# Patient Record
Sex: FEMALE | Race: WHITE
[De-identification: names, ages, dates, MRNs, and addresses within clinical notes are randomized per-mention and may not be internally consistent; named-entity substitution may affect disease eponyms.]

---

## 2017-05-23 ENCOUNTER — HOSPITAL ENCOUNTER (OUTPATIENT)
Dept: HOSPITAL 80 - CED | Age: 72
Discharge: HOME | End: 2017-05-23
Attending: INTERNAL MEDICINE
Payer: COMMERCIAL

## 2017-05-23 VITALS — SYSTOLIC BLOOD PRESSURE: 136 MMHG | DIASTOLIC BLOOD PRESSURE: 81 MMHG | HEART RATE: 63 BPM | OXYGEN SATURATION: 98 %

## 2017-05-23 VITALS — RESPIRATION RATE: 16 BRPM

## 2017-05-23 VITALS — TEMPERATURE: 98.1 F

## 2017-05-23 DIAGNOSIS — R94.31: Primary | ICD-10-CM

## 2017-05-23 DIAGNOSIS — R07.9: ICD-10-CM

## 2017-05-23 DIAGNOSIS — E78.5: ICD-10-CM

## 2017-05-23 LAB
% IMMATURE GRANULYOCYTES: 0.2 % (ref 0–1.1)
ABSOLUTE IMMATURE GRANULOCYTES: 0.01 10^3/UL (ref 0–0.1)
ABSOLUTE NRBC COUNT: 0 10^3/UL (ref 0–0.01)
ADD DIFF?: NO
ADD MORPH?: YES
ANION GAP SERPL CALC-SCNC: 17 MEQ/L (ref 8–16)
CALCIUM SERPL-MCNC: 9.4 MG/DL (ref 8.5–10.4)
CHLORIDE SERPL-SCNC: 104 MEQ/L (ref 97–110)
CO2 SERPL-SCNC: 21 MEQ/L (ref 22–31)
CREAT SERPL-MCNC: 0.8 MG/DL (ref 0.6–1)
ERYTHROCYTE [DISTWIDTH] IN BLOOD BY AUTOMATED COUNT: 12.8 % (ref 11.5–15.2)
GFR SERPL CREATININE-BSD FRML MDRD: > 60 ML/MIN/{1.73_M2}
GLUCOSE SERPL-MCNC: 95 MG/DL (ref 70–100)
HCT VFR BLD CALC: 41.3 % (ref 38–47)
HGB BLD-MCNC: 14 G/DL (ref 12.6–16.3)
MACROCYTES: (no result)
MCH RBC BLDCO QN: 32 PG (ref 27.9–34.1)
MCHC RBC AUTO-ENTMCNC: 33.9 G/DL (ref 32.4–36.7)
MCV RBC AUTO: 94.3 FL (ref 81.5–99.8)
NRBC-AUTO%: 0 % (ref 0–0.2)
PLATELET # BLD EST: ADEQUATE 10*3/UL
PLATELET # BLD: 301 10^3/UL (ref 150–400)
PMV BLD AUTO: 9.9 FL (ref 8.7–11.7)
POTASSIUM SERPL-SCNC: 3.5 MEQ/L (ref 3.5–5.2)
RBC # BLD AUTO: 4.38 10^6/UL (ref 4.18–5.33)
SODIUM SERPL-SCNC: 142 MEQ/L (ref 134–144)
TROPONIN I SERPL-MCNC: < 0.012 NG/ML (ref 0–0.03)

## 2017-05-23 PROCEDURE — 71010: CPT

## 2017-05-23 PROCEDURE — 93458 L HRT ARTERY/VENTRICLE ANGIO: CPT

## 2017-05-23 PROCEDURE — B2111ZZ FLUOROSCOPY OF MULTIPLE CORONARY ARTERIES USING LOW OSMOLAR CONTRAST: ICD-10-PCS | Performed by: INTERNAL MEDICINE

## 2017-05-23 PROCEDURE — 93306 TTE W/DOPPLER COMPLETE: CPT

## 2017-05-23 PROCEDURE — 4A020N7 MEASUREMENT OF CARDIAC SAMPLING AND PRESSURE, LEFT HEART, OPEN APPROACH: ICD-10-PCS | Performed by: INTERNAL MEDICINE

## 2017-05-23 PROCEDURE — 93005 ELECTROCARDIOGRAM TRACING: CPT

## 2017-05-23 PROCEDURE — C1760 CLOSURE DEV, VASC: HCPCS

## 2017-05-23 PROCEDURE — B2151ZZ FLUOROSCOPY OF LEFT HEART USING LOW OSMOLAR CONTRAST: ICD-10-PCS | Performed by: INTERNAL MEDICINE

## 2017-05-23 PROCEDURE — 99291 CRITICAL CARE FIRST HOUR: CPT

## 2017-05-23 RX ADMIN — ASPIRIN 81 MG ONE MG: 81 TABLET ORAL at 10:38

## 2017-05-23 NOTE — CPEKG
Heart Rate: 76

RR Interval: 789

P-R Interval: 164

QRSD Interval: 82

QT Interval: 364

QTC Interval: 410

P Axis: -6

QRS Axis: -38

T Wave Axis: -18

EKG Severity - ABNORMAL ECG -

EKG Impression: SINUS RHYTHM

EKG Impression: LEFT AXIS DEVIATION

EKG Impression: ABNORMAL T, CONSIDER ISCHEMIA, INFERIOR LEADS

Electronically Signed By: Luke Magdaleno 23-May-2017 10:55:10

## 2017-05-23 NOTE — CPIP
[f rep st]



                                                       INVASIVE CARDIAC PROCEDURE





DATE OF PROCEDURE:  05/23/2017



PROCEDURE:  

1.  Coronary angiography.

2.  Left ventriculography.



INDICATION:  Acute coronary syndrome with EKG changes and normal troponins.



ACCESS:  Patient was prepped and draped in sterile fashion.  1% lidocaine was used to anesthetize th
e right inguinal region.  A 6-Portuguese introducer sheath was placed selectively into the right common 
femoral artery via modified Seldinger technique.



CORONARY ANGIOGRAPHY:  A 6-Portuguese JL4 was advanced to the left main coronary artery and images obtai
yadira.  The left main coronary artery bifurcated into an LAD and circumflex coronary arteries.  The le
ft main coronary artery was short and appeared normal.  The left anterior descending coronary artery
 gave rise to 1 prominent diagonal branch.  The left anterior descending coronary artery and its melinda
gonal branch appeared normal.  The circumflex coronary artery was a large vessel, but was nondominan
t.  Circumflex coronary artery gave rise to 4 OM branches.  The circumflex coronary artery and its c
omplement of OM branches appeared normal.  A 6-Portuguese JR4 was advanced to the right coronary artery 
and images obtained.  The right coronary artery is dominant.  The right coronary artery appeared nor
mal.



LEFT VENTRICULOGRAPHY:  A 6-Portuguese pigtail catheter was advanced in the left ventricle and images ob
tained.  Left ventricle was normal in size and had normal systolic function with an estimated ejecti
on fraction of 60%.



COMPLICATIONS:  None.



CONCLUSIONS:  

1.  Normal coronary arteries.

2.  Normal left ventricular size and systolic function.





Job #:  [id number]

## 2017-05-23 NOTE — EDPHY
H & P


Time Seen by Provider: 05/23/17 10:42


HPI/ROS: 





Chief complaint.  Chest pain





HPI.  71-year-old female presents with chest pain for approximately 20 minutes.

  She was feeling well this morning and during exercise she developed pressure-

type mid chest discomfort.  No radiation.  No shortness of breath. Her pain 

continues.  No previous similar symptoms. No unusual leg pain or swelling.  Not 

sick including no fever cough.  Unsure whether the pain is worse with exertion 

or not.  It is not worse with breathing or movement.





ROS


Constitutional.  no fever/chills, no weakness


Eyes.  no problems with vision


ENT.  no sore throat, no nasal drainage


Cardiovascular.  Chest pain


Respiratory.  no shortness of breath, no cough


Abdominal.  no abdominal pain, no nausea/vomiting, no diarrhea


.  no problems urinating


MS.  no calf pain/swelling, no neck/back pain, no joint pain


Skin.  no rash


Lymph.  no swollen glands


Neuro.  no headache, no dizziness, no difficulty walking or with speech


Past Medical/Surgical History: 





Hypothyroid





Mother is 92 and healthy; unknown father history


Social History: 





, nonsmoker, no alcohol


Smoking Status: Never smoked


Physical Exam: 





General Appearance:  Alert well-developed female moderate distress vital signs 

significant for blood pressure 158/119


Eyes: Pupils equal and round no pallor or injection.


ENT, Mouth:  Mucous membranes are moist.


Respiratory:  There are no retractions, lungs are clear to auscultation.


Cardiovascular: Regular rate and rhythm.


Gastrointestinal:   Abdomen is soft and nontender, no masses, bowel sounds 

normal.


Neurological:  Awake and alert, sensory and motor exams grossly normal.


Skin: Warm and dry, no rashes.


Musculoskeletal:  Neck is supple nontender.


Extremities  symmetrical, full range of motion.


Psychiatric: Patient is oriented X 3, there is no agitation.


Constitutional: 


 Initial Vital Signs











Temperature (C)  36.4 C   05/23/17 10:33


 


Heart Rate  85   05/23/17 10:33


 


Respiratory Rate  18   05/23/17 10:33


 


Blood Pressure  158/119 H  05/23/17 10:33


 


O2 Sat (%)  100   05/23/17 10:33








 











O2 Delivery Mode               Nasal Cannula


 


O2 (L/minute)                  2














Allergies/Adverse Reactions: 


 





No Known Allergies Allergy (Verified 05/23/17 10:42)


 








Home Medications: 














 Medication  Instructions  Recorded


 


Levothyroxine 100 mcg PO DAILY 01/03/16


 


Aspirin 81mg (*) 80 mg PO DAILY 09/27/16


 


Multi-Day Vitamins 1 tab PO DAILY 09/27/16


 


Vitamin D3 600 mg PO DAILY 09/27/16














Medical Decision Making





- Diagnostics


EKG Interpretation: 





EKG interpreted by me shows normal sinus rhythm with normal interval and left 

axis deviation.  QRS is normal.  However there are T-wave inversions consistent 

with ischemia in leads 3 and 4 as well as V3.  No significant ST elevation or 

depression.  No arrhythmia.  The rate is 76





No previous EKGs for comparison


Imaging Results: 


 Imaging Impressions





Chest X-Ray  05/23/17 10:51


Impression: Normal chest x-ray.








One-view chest x-ray interpreted by me as nonacute


Procedures: 





IV normal saline, monitor.  Aspirin is given in the emergency department 

nitroglycerin is given sublingually.


ED Course/Re-evaluation: 





Re-evaluation 10:56 a.m. patient has had some relief from the 1st nitroglycerin 

tablet but discomfort continues.  2nd nitroglycerin tablet is given.  Diastolic 

blood pressure now 89





I consulted and discussed case with Dr. Eduardo Mobley, cardiology, who will see 

the patient in the emergency department at [facility].  He 

agrees with treatment and management so far.





Re-evaluation again at 11:10 a.m. and after the 2nd nitroglycerin her 

discomfort is almost completely relieved.  Diastolic blood pressure 81





The patient, her , and I discussed EKG findings, laboratory evaluations 

so far, treatment plan including need for emergent transfer to [facility] for further evaluation.  They expressed understanding and 

agreement





I called and consulted the emergency department at [facility] 

and notified them of patient's incoming status and planned to have Dr. Mobley 

evaluate the patient in the ED





re-eval again at 11:23--stable and improved





Transferred to [facility] via [facility] ambulance


Differential Diagnosis: 





This certainly sounds like ischemic chest pain. However it is not an ST 

elevation MI.  She has T-wave inversions in the inferior leads suggestive of 

inferior ischemia.  Pain has been controlled using nitroglycerin.  She is 

improved and stable.  She has also been given aspirin.  We have discussed the 

case with Interventional Cardiology who will evaluate the patient as soon as 

she arrives in the emergency department


Critical Care Time: 





Critical care time exclusive procedures 40 minutes





- Data Points


Laboratory Results: 


 Laboratory Results





 05/23/17 10:40 





 05/23/17 10:40 





 











  05/23/17 05/23/17





  10:40 10:40


 


WBC    4.57 10^3/uL 10^3/uL





    (3.80-9.50) 


 


RBC    4.38 10^6/uL 10^6/uL





    (4.18-5.33) 


 


Hgb    14.0 g/dL g/dL





    (12.6-16.3) 


 


Hct    41.3 % %





    (38.0-47.0) 


 


MCV    94.3 fL fL





    (81.5-99.8) 


 


MCH    32.0 pg pg





    (27.9-34.1) 


 


MCHC    33.9 g/dL g/dL





    (32.4-36.7) 


 


RDW    12.8 % %





    (11.5-15.2) 


 


Plt Count    301 10^3/uL 10^3/uL





    (150-400) 


 


MPV    9.9 fL fL





    (8.7-11.7) 


 


Neut % (Auto)    51.8 % %





    (39.3-74.2) 


 


Lymph % (Auto)    33.3 % %





    (15.0-45.0) 


 


Mono % (Auto)    10.1 % %





    (4.5-13.0) 


 


Eos % (Auto)    3.3 % %





    (0.6-7.6) 


 


Baso % (Auto)    1.3 % %





    (0.3-1.7) 


 


Nucleat RBC Rel Count    0.0 % %





    (0.0-0.2) 


 


Absolute Neuts (auto)    2.37 10^3/uL 10^3/uL





    (1.70-6.50) 


 


Absolute Lymphs (auto)    1.52 10^3/uL 10^3/uL





    (1.00-3.00) 


 


Absolute Monos (auto)    0.46 10^3/uL 10^3/uL





    (0.30-0.80) 


 


Absolute Eos (auto)    0.15 10^3/uL 10^3/uL





    (0.03-0.40) 


 


Absolute Basos (auto)    0.06 10^3/uL 10^3/uL





    (0.02-0.10) 


 


Absolute Nucleated RBC    0.00 10^3/uL 10^3/uL





    (0-0.01) 


 


Immature Gran %    0.2 % %





    (0.0-1.1) 


 


Immature Gran #    0.01 10^3/uL 10^3/uL





    (0.00-0.10) 


 


Platelet Estimate    Pending 





   


 


Sodium  142 mEq/L mEq/L  





   (134-144)  


 


Potassium  3.5 mEq/L mEq/L  





   (3.5-5.2)  


 


Chloride  104 mEq/L mEq/L  





   ()  


 


Carbon Dioxide  21 mEq/l L mEq/l  





   (22-31)  


 


Anion Gap  17 mEq/L H mEq/L  





   (8-16)  


 


BUN  18 mg/dL mg/dL  





   (7-23)  


 


Creatinine  0.8 mg/dL mg/dL  





   (0.6-1.0)  


 


Estimated GFR  > 60   





   


 


Glucose  95 mg/dL mg/dL  





   ()  


 


Calcium  9.4 mg/dL mg/dL  





   (8.5-10.4)  


 


Troponin I  < 0.012 ng/mL ng/mL  





   (0-0.034)  


 


NT-Pro-B Natriuret Pep  246 pg/mL H pg/mL  





   (0-125)  











Medications Given: 


 








Discontinued Medications





Aspirin (Aspirin)  324 mg PO EDNOW ONE


   Stop: 05/23/17 10:39


   Last Admin: 05/23/17 10:38 Dose:  324 mg


Sodium Chloride (Ns)  1,000 mls @ 0 mls/hr IV ONCE ONE


   PRN Reason: Wide Open


   Stop: 05/23/17 11:16


   Last Admin: 05/23/17 10:50 Dose:  1,000 mls


Nitroglycerin (Nitrostat)  0.4 mg SL EDNOW ONE


   Stop: 05/23/17 10:57


   Last Admin: 05/23/17 10:53 Dose:  0.4 mg


Nitroglycerin (Nitrostat)  0.4 mg SL EDNOW ONE


   Stop: 05/23/17 10:59


   Last Admin: 05/23/17 10:58 Dose:  0.4 mg








Departure





- Departure


Disposition: [facility]


Clinical Impression: 


Chest pain


Qualifiers:


 Chest pain type: chest pain due to myocardial ischemia Ischemic chest pain type

: unspecified angina pectoris type Qualified Code(s): I20.9 - Angina pectoris, 

unspecified





Condition: Good


Instructions:  Chest Pain (ED)


Referrals: 


NONE *PRIMARY CARE P,. [Primary Care Provider] - As per Instructions

## 2017-05-23 NOTE — GCON
[f rep st]



                                                                    CONSULTATION





DATE OF CONSULTATION:  05/23/2017



CHIEF COMPLAINT:  We have been asked by Dr. Magdaleno to evaluate the patient with a chief complaint o
f chest pain.



HISTORY OF PRESENT ILLNESS:  The patient is a 71-year-old female with risk factors including age, heladio
rderline hyperlipidemia, and family history who presents with a chief complaint of chest pain.  The 
patient was in her usual state of health until the day of admission, when she decided to do an exerc
ise routine.  In the midst of doing her exercise, she began to experience chest pain.  The chest vivian
n was described as a pressure in the center of her chest without radiation.  The chest pain was not 
associated with nausea, vomiting, or diaphoresis.  The chest discomfort continued and became quite u
ncomfortable, prompting her to seek further evaluation at the urgent care center.  Her initial EKG d
emonstrated T-wave inversions in III and aVF.  Her initial troponin was within normal limits.  We ha
ve been consulted to help in the further management of this patient.  The patient denies a previous 
history of chest pain.  She remains moderately active, exercising on a regular basis.  There is no h
istory of palpitations, orthopnea, or PND.



PAST MEDICAL HISTORY:  

1.  Hypothyroidism. 

2.  Borderline hyperlipidemia.



MEDICATIONS:  Please see medicine reconciliation form.



ALLERGIES:  No known drug allergies.



SOCIAL HISTORY:  The patient lives in [place] with her .  She does not smoke.  She denies p
roblems with alcohol.



FAMILY HISTORY:  Notable for coronary artery disease on the father's side of the family.



REVIEW OF SYSTEMS:  10-point review of systems is negative, except as noted in HPI.



PHYSICAL EXAMINATION:  GENERAL:  Patient is resting comfortably in bed at this time.  She does not a
ppear to be in acute distress.  VITALS:  Temperature is afebrile.  Pulse is 63, blood pressure 136/8
1, respiratory rate is 16, SaO2 is 98% on room air.  HEENT:  Normocephalic, atraumatic.  Extraocular
 muscles intact.  NECK:  No JVD.  No bruits.  LUNGS:  Clear to auscultation bilaterally.  CARDIOVASC
ULAR:  Regular rate and rhythm S1, S2.  Grade 2/6 decrescendo murmur is noted at the left sternal heladio
rder.  ABDOMEN:  Soft, nontender.  Normoactive bowel sounds.  No hepatosplenomegaly noted.  EXTREMIT
IES:  No clubbing, cyanosis, or edema.  SKIN:  No evidence of rash.  NEURO:  Patient is awake, alert
, and oriented x3.



LABORATORY:  White blood cell count is 4.57, hemoglobin 14.0, hematocrit 41.3, platelet count 301.  
Sodium 142, potassium 3.5, chloride 104, CO2 21, BUN 18, creatinine 0.8.  Troponin within normal goncalves
its x1.  



EKG demonstrates sinus rhythm, leftward axis, normal intervals, T-wave inversions in leads III, AVF,
 and V3.



ASSESSMENT AND PLAN:  The patient is a 71-year-old female with multiple cardiac risk factors who pre
sents with an acute coronary syndrome.  Her EKG demonstrates T-wave inversions in the inferior leads
.  Her initial troponin is within normal limits. 



Reviewed options for risk stratification with the patient and her  including an early invasiv
e strategy as well as a conservative strategy.  Patient and her  would like this pursue an ea
rly invasive strategy at this time.  Risks and benefits of cardiac catheterization discussed.  Will 
arrange to have this performed.





Job #:  [id number]

## 2017-05-23 NOTE — ECHO
[id number]

[id number]



+---------------------------------------------------+      [address]

:                                                   :       [place] [id number]

:                                                   :           [phone]

+---------------------------------------------------+       Fax [phone]



                       Adult Echocardiographic Report



+----------------------------------------------------------------------------

----+

:Name: ALE COREY Carolyn Date: 2017 12:57 PM                        

    :

:MRN: [id number]      Hospital Admission Number: [id number] Location

: ER:

:: 1945      Gender: Female                                         

    :

:Age: 71 yrs          Race: WH                                               

    :

:Reason For Study: Chest Pain                                                

    :

+----------------------------------------------------------------------------

----+

MMode/2D Measurements \T\ Calculations

IVSd: 0.41 cm       LVIDd: 4.1 cm      FS: 43.7 %        Ao root diam:

LVPWd: 0.69 cm      LVIDs: 2.3 cm      EDV(Teich):       2.9 cm

                                       74.2 ml           LA dimension:

                                       ESV(Teich):       3.2 cm

                                       18.3 ml

                                       EF(Teich): 75.3 %



        _____________________________________________________________

LVLd ap4: 6.9 cm    SV(MOD-sp4):

EDV(MOD-sp4):       48.0 ml

62.0 ml

LVLs ap4: 5.0 cm

ESV(MOD-sp4):

14.0 ml

EF(MOD-sp4): 77.4 %



Normal Measurement Values:

+----------------------------------------------------------------------------

----------------------------------+

:LVIDd (3.5-5.7cm)    IVSd (0.6-1.1cm)     LVPWd (0.6-1.1cm)     Aortic Root 

(2.0-3.7cm)Left Atrium (1.5-4.0cm):

:LV Vol(d) (76-115ml) LV Vol(s) (29-48ml)  Ejec Fraction (50-65%)PV Miguel Angel (0.6-

1.2m/s)    TV Miguel Angel (0.4-1.0m/s)    :

:MV E Miguel Angel (0.8-1.0m/s)MV A Miguel Angel (0.3-1.0m/s)LVOT Miguel Anegl (0.7-1.2m/s) Asc Ao Miguel Angel (

0.9-1.8m/s)                       :

+----------------------------------------------------------------------------

----------------------------------+



Doppler Measurements \T\ Calculations

MV E max miguel angel: 60.7 cm/secAo V2 max: 155.0 cm/sec AI max miguel angel: 464.0 cm/sec

MV A max miguel angel: 84.4 cm/secAo max P.6 mmHg     AI max P.1 mmHg

MV E/A: 0.72                                     AI dec slope: 203.0 cm/sec2

                                                 AI P1/2t: 669.5 msec



Left Ventricle

The left ventricle is normal in size. There is normal left ventricular wall

thickness. Left ventricular systolic function is normal. Ejection Fraction =

65-70%. No regional wall motion abnormalities noted.





Right Ventricle

The right ventricle is normal in size and function.



Atria

The left atrium is mildly dilated. Right atrial size is normal. Chiari

network (normal variant) is noted.





Mitral Valve

The mitral valve is normal in structure and function. There is no evidence

of mitral valve prolapse. There is no mitral valve stenosis. There is mild

mitral regurgitation.



Tricuspid Valve

Normal tricuspid valve. There is trace tricuspid regurgitation.



Aortic Valve

The aortic valve is not well visualized. There is no aortic stenosis. Mild

aortic regurgitation.



Pulmonic Valve

The pulmonic valve is not well visualized.



Great Vessels

The aortic root is normal size.





Pericardium/Pleural

There is no pericardial effusion.



Conclusion

A complete two-dimensional transthoracic echocardiogram was performed (2D,

M-mode, Doppler and color flow Doppler).

1. The left ventricle is normal in size. There is normal left ventricular

wall thickness. Ejection Fraction = 65-70%. No regional wall motion

abnormalities noted.

2. The mitral valve is normal in structure and function. There is mild

mitral regurgitation.

3. The aortic valve is not well visualized. There is no aortic stenosis.

Mild aortic regurgitation.

4. There is no pericardial effusion.

5. No old studies for comparison.



_____________________________________________________________________________







Final Reading Physician:

                        David Torres MD  electronically signed on 2017

                        05:10 PM

Ordering Physician: MAXIMILIAN VALENTIN

Performed By: Harini Davis RDCS

## 2017-06-06 ENCOUNTER — HOSPITAL ENCOUNTER (OUTPATIENT)
Dept: HOSPITAL 80 - BHFA | Age: 72
End: 2017-06-06
Attending: INTERNAL MEDICINE
Payer: COMMERCIAL

## 2017-06-06 DIAGNOSIS — R07.9: Primary | ICD-10-CM

## 2017-10-11 ENCOUNTER — HOSPITAL ENCOUNTER (OUTPATIENT)
Dept: HOSPITAL 80 - CIMAGING | Age: 72
End: 2017-10-11
Attending: INTERNAL MEDICINE
Payer: COMMERCIAL

## 2017-10-11 DIAGNOSIS — Z12.31: Primary | ICD-10-CM

## 2017-10-11 PROCEDURE — G0202 SCR MAMMO BI INCL CAD: HCPCS

## 2018-01-12 ENCOUNTER — HOSPITAL ENCOUNTER (OUTPATIENT)
Dept: HOSPITAL 80 - BMCIMAGING | Age: 73
End: 2018-01-12
Attending: INTERNAL MEDICINE
Payer: COMMERCIAL

## 2018-01-12 DIAGNOSIS — Z13.820: Primary | ICD-10-CM

## 2018-01-12 DIAGNOSIS — M81.0: ICD-10-CM
